# Patient Record
Sex: FEMALE | Race: WHITE | URBAN - METROPOLITAN AREA
[De-identification: names, ages, dates, MRNs, and addresses within clinical notes are randomized per-mention and may not be internally consistent; named-entity substitution may affect disease eponyms.]

---

## 2018-05-18 ENCOUNTER — OFFICE VISIT (OUTPATIENT)
Dept: FAMILY MEDICINE | Facility: CLINIC | Age: 53
End: 2018-05-18
Payer: COMMERCIAL

## 2018-05-18 VITALS
DIASTOLIC BLOOD PRESSURE: 78 MMHG | TEMPERATURE: 98.4 F | OXYGEN SATURATION: 99 % | HEART RATE: 62 BPM | RESPIRATION RATE: 16 BRPM | SYSTOLIC BLOOD PRESSURE: 116 MMHG

## 2018-05-18 DIAGNOSIS — Z23 NEED FOR VACCINATION: ICD-10-CM

## 2018-05-18 DIAGNOSIS — H11.31 SUBCONJUNCTIVAL HEMORRHAGE OF RIGHT EYE: Primary | ICD-10-CM

## 2018-05-18 PROCEDURE — 99203 OFFICE O/P NEW LOW 30 MIN: CPT | Mod: 25 | Performed by: NURSE PRACTITIONER

## 2018-05-18 PROCEDURE — 90715 TDAP VACCINE 7 YRS/> IM: CPT | Performed by: NURSE PRACTITIONER

## 2018-05-18 PROCEDURE — 90471 IMMUNIZATION ADMIN: CPT | Performed by: NURSE PRACTITIONER

## 2018-05-18 NOTE — MR AVS SNAPSHOT
"              After Visit Summary   2018    Ivanna Mahan    MRN: [id number]           Patient Information     Date Of Birth          1965        Visit Information        Provider Department      2018 2:40 PM Faby Isaacs APRN CNP [facility]        Today's Diagnoses     Subconjunctival hemorrhage of right eye    -  1    Need for vaccination           Follow-ups after your visit        Who to contact     If you have questions or need follow up information about today's clinic visit or your schedule please contact [facility] directly at [phone].  Normal or non-critical lab and imaging results will be communicated to you by M-Farmhart, letter or phone within 4 business days after the clinic has received the results. If you do not hear from us within 7 days, please contact the clinic through M-Farmhart or phone. If you have a critical or abnormal lab result, we will notify you by phone as soon as possible.  Submit refill requests through LabDoor or call your pharmacy and they will forward the refill request to us. Please allow 3 business days for your refill to be completed.          Additional Information About Your Visit        MyChart Information     LabDoor lets you send messages to your doctor, view your test results, renew your prescriptions, schedule appointments and more. To sign up, go to [email or web address] . Click on \"Log in\" on the left side of the screen, which will take you to the Welcome page. Then click on \"Sign up Now\" on the right side of the page.     You will be asked to enter the access code listed below, as well as some personal information. Please follow the directions to create your username and password.     Your access code is: [email or web address]  Expires: 2018  4:00 PM     Your access code will  in 90 days. If you need help or a new code, please call your [facility] or [phone].        Care EveryWhere ID     This is your Care " EveryWhere ID. This could be used by other organizations to access your [facility] medical records  [id number]        Your Vitals Were     Pulse Temperature Respirations Pulse Oximetry          62 98.4  F (36.9  C) (Temporal) 16 99%         Blood Pressure from Last 3 Encounters:   05/18/18 116/78   03/24/09 104/62   06/03/08 110/80    Weight from Last 3 Encounters:   03/16/07 160 lb (72.6 kg)              We Performed the Following     1st  Administration  [13607]     TDAP VACCINE (ADACEL) [05956.002]        Primary Care Provider Office Phone # Fax #    Faby DENIS Reynoso [facility] [phone] [address] [facility] [address]        Equal Access to Services     ROBERTO CARLOS DESHPANDE : Hadii aad ku hadasho Soomaali, waaxda luqadaha, qaybta kaalmada adeegyada, waxay kristiin hayjanusz mcgill . So [facility] [phone].    ATENCIÓN: Si habla español, tiene a molina disposición servicios gratuitos de asistencia lingüística. Llame al [phone].    We comply with applicable federal civil rights laws and [place] laws. We do not discriminate on the basis of race, color, national origin, age, disability, sex, sexual orientation, or gender identity.            Thank you!     Thank you for choosing [facility]  for your care. Our goal is always to provide you with excellent care. Hearing back from our patients is one way we can continue to improve our services. Please take a few minutes to complete the written survey that you may receive in the mail after your visit with us. Thank you!             Your Updated Medication List - Protect others around you: Learn how to safely use, store and throw away your medicines at www.disposemymeds.org.          This list is accurate as of 5/18/18  4:00 PM.  Always use your most recent med list.                   Brand Name Dispense Instructions for use Diagnosis    bisacodyl 5 MG EC tablet    DULCOLAX    4 tablet    Take 1 tablet by mouth daily as needed for  constipation. Take as directed for colonoscopy prep    Personal history of colonic polyps       levothyroxine 100 MCG tablet    SYNTHROID    30 tablet    Take 1 tablet by mouth daily.    Other specified acquired hypothyroidism       polyethylene glycol powder    MIRALAX    510 g    Take  by mouth daily. Mix and take as directed for colonoscopy prep    Personal history of colonic polyps

## 2018-05-18 NOTE — NURSING NOTE
"Chief Complaint   Patient presents with     Red Eye       Initial /78  Pulse 62  Temp 98.4  F (36.9  C) (Temporal)  Resp 16  SpO2 99% Estimated body mass index is 27.46 kg/(m^2) as calculated from the following:    Height as of 11/28/06: 5' 4\" (1.626 m).    Weight as of 3/16/07: 160 lb (72.6 kg).  Medication Reconciliation: complete    Darby Hammer CMA      "

## 2018-05-18 NOTE — PROGRESS NOTES
"  SUBJECTIVE:   Ivanna Mahan is a 52 year old female who presents to clinic today for the following health issues:  PCP in [facility], here visiting her daughter.     HPI   Eye(s) Problem  Onset: 1 day    Description:   Location: right  Pain: no  Redness: YES    Accompanying Signs & Symptoms:  Discharge/mattering: no  Swelling: no  Visual changes: YES- \"looks a little fuzzier on that side\"  Fever: no  Nasal Congestion: no  Bothered by bright lights: no    History:   Trauma: no   Foreign body exposure: no    Precipitating factors:   Wearing contacts: no    Alleviating factors:  Improved by: none    Therapies Tried and outcome: NA    Problem list and histories reviewed & adjusted, as indicated.  Additional history: as documented    Patient repots that she noticed it was slightly red last night and she woke up this morning and it was red. She states that she thinks she has a scratch on her eye- touched a dark spot and there was a ridge on there eye surface. She states that she has not itched it or had anything get stuck in it. She denies wearing contacts, having pink eye exposure, or URI symptoms. She is not on any NSAIDs.    Patient Active Problem List   Diagnosis     Acquired hypothyroidism     Past Surgical History:   Procedure Laterality Date     HC COLONOSCOPY W SNARE REMOVAL TUMOR/POLYP/LESION  05    REPEAT IN 5 YEARS     NO HISTORY OF SURGERY         Social History   Substance Use Topics     Smoking status: Never Smoker     Smokeless tobacco: Never Used     Alcohol use Yes      Comment: social     Family History   Problem Relation Age of Onset     CANCER Mother      colon,  x 2     Cancer - colorectal Mother      DIABETES Other      aunt,      Thyroid Disease Sister      Neurologic Disorder Paternal Uncle      parkinson     Cancer - colorectal Maternal Aunt      Cancer - colorectal Maternal Uncle          Current Outpatient Prescriptions   Medication Sig Dispense Refill     bisacodyl " (DULCOLAX) 5 MG EC tablet Take 1 tablet by mouth daily as needed for constipation. Take as directed for colonoscopy prep (Patient not taking: Reported on 5/18/2018) 4 tablet 0     levothyroxine (SYNTHROID) 100 MCG tablet Take 1 tablet by mouth daily. (Patient not taking: Reported on 5/18/2018) 30 tablet 1     polyethylene glycol (MIRALAX) powder Take  by mouth daily. Mix and take as directed for colonoscopy prep (Patient not taking: Reported on 5/18/2018) 510 g 0     Allergies   Allergen Reactions     No Known Allergies      No lab results found.   BP Readings from Last 3 Encounters:   05/18/18 116/78   03/24/09 104/62   06/03/08 110/80    Wt Readings from Last 3 Encounters:   03/16/07 160 lb (72.6 kg)        ROS:  Constitutional, HEENT, cardiovascular, pulmonary, GI, , musculoskeletal, neuro, skin, endocrine and psych systems are negative, except as otherwise noted.    This document serves as a record of the services and decisions personally performed and made by Faby Isaacs CNP. It was created on his/her behalf by Chrissie Liu, trained medical scribe. The creation of this document is based the provider's statements to the medical scribes.    Dwayneibchandler Liu 3:00 PM, May 18, 2018  OBJECTIVE:     /78  Pulse 62  Temp 98.4  F (36.9  C) (Temporal)  Resp 16  SpO2 99%  There is no height or weight on file to calculate BMI.     GENERAL: healthy, alert and no distress  EYES: subconjunctival hemorrage of lower, inner corner of left eye, slight hematoma formation of lower portion of scleral however this is mild, no scleral injection, no otherwise sclerae are normal, EOM intact.  HENT: ear canals and TM's normal, nose and mouth without ulcers or lesions  NECK: no adenopathy, no asymmetry, masses, or scars and thyroid normal to palpation    Diagnostic Test Results:  none     ASSESSMENT/PLAN:       ICD-10-CM    1. Subconjunctival hemorrhage of right eye H11.31    2. Need for vaccination Z23 1st   Administration  [52038]     TDAP VACCINE (ADACEL) [94640.002]     Reviewed symptoms and etiology patient presents today. Advised starting OTC saline eye drops for comfort. Reviewed the healing course of a subconjunctival hemorrhage, healing and resolution and advised to not to touch her eye.    Tdap immunization administered today.    Follow up with PCP if recurrent and will provide ophthalmology specialist referral.      The information in this document, created by the medical scribe for me, accurately reflects the services I personally performed and the decisions made by me. I have reviewed and approved this document for accuracy prior to leaving the patient care area.  Faby Iasacs CNP  3:00 PM, 05/18/18    DENIS Weems CNP  [facility]

## 2018-05-20 ENCOUNTER — HEALTH MAINTENANCE LETTER (OUTPATIENT)
Age: 53
End: 2018-05-20

## 2018-07-16 ENCOUNTER — TELEPHONE (OUTPATIENT)
Dept: FAMILY MEDICINE | Facility: CLINIC | Age: 53
End: 2018-07-16

## 2018-07-16 NOTE — LETTER
[facility]  [address]  [place] [address]  Phone: [phone]  Fax: [phone]  July 31, 2018      Ivanna Mahan  [address] [place] [id number]      Dear Ivanna,    We care about your health and have reviewed your health plan including your medical conditions, medications, and lab results.  Based on this review, it is recommended that you follow up regarding the following health topic(s):  -Breast Cancer Screening  -Colon Cancer Screening  -Cervical Cancer Screening  -Wellness (Physical) Visit     We recommend you take the following action(s):  -schedule a WELLNESS (Physical) APPOINTMENT.  We will perform the following labs: Lipids (fasting cholesterol - nothing to eat except water and/or meds for 8-10 hours) and TSH (thyroid test).  -schedule a MAMMOGRAM which is due. Please disregard this reminder if you have had this exam elsewhere within the last 1-2 years please let us know so we can update your records.  -schedule a COLONOSCOPY to look for colon cancer (due every 10 years or 5 years in higher risk situations.)  Colonoscopies can prevent 90-95% of colon cancer deaths.  Problem lesions can be removed before they ever become cancer.  If you do not wish to do a colonoscopy or cannot afford to do one at this time, there is another option called a Fecal Immunochemical Occult Blood Test (FIT) a take home stool sample kit.  It does not replace the colonoscopy for colorectal cancer screening, but it can detect hidden bleeding in the lower colon.  It does need to be repeated every year and if a positive result is obtained, you would be referred for a colonoscopy.  If you have completed either one of these tests at another facility, please have the records sent to our clinic for our records.  -schedule a PAP SMEAR EXAM which is due.  Please disregard this reminder if you have had this exam elsewhere within the last year.  It would be helpful for us to have a  copy of your recent pap smear report to update your records.     Please call us at the [facility] - [phone] (or use En[facility]) to address the above recommendations.     Thank you for trusting [facility] and we appreciate the opportunity to serve you.  We look forward to supporting your healthcare needs in the future.    Healthy Regards,    Your Health Care Team  [facility]

## 2018-07-16 NOTE — TELEPHONE ENCOUNTER
Summary:    Patient is due/failing the following:   TSH, COLONOSCOPY, LDL, MAMMOGRAM, PAP and PHYSICAL    Action needed:   Patient needs office visit for PAP/PE., Patient needs fasting lab only appointment and schedule a mammogram and colonoscopy     Type of outreach:    Phone, spoke to patient.  patient requested a call back on 07/17/2018 to assist with scheduling    Questions for provider review:    None                                                                                                                                    Cayla Lafleur       Chart routed to Care Team .        Panel Management Review      Patient has the following on her problem list: None      Composite cancer screening  Chart review shows that this patient is due/due soon for the following Pap Smear, Mammogram and Colonoscopy

## 2018-10-03 ENCOUNTER — TELEPHONE (OUTPATIENT)
Dept: FAMILY MEDICINE | Facility: CLINIC | Age: 53
End: 2018-10-03

## 2018-10-03 NOTE — TELEPHONE ENCOUNTER
Summary:    Patient is due/failing the following:   COLONOSCOPY, TSH,  LDL, MAMMOGRAM, PAP and PHYSICAL    Action needed:   Patient needs office visit for physical and PAP ., Patient needs fasting lab only appointment and schedule a mammogram and colonoscopy or complete a FIT test     Type of outreach:    Phone, left message for patient to call back.     Questions for provider review:    None                                                                                                                                    Cayla Lafleur       Chart routed to Care Team .        Panel Management Review      Patient has the following on her problem list: None      Composite cancer screening  Chart review shows that this patient is due/due soon for the following Pap Smear, Mammogram and Colonoscopy

## 2018-10-22 ENCOUNTER — TRANSFERRED RECORDS (OUTPATIENT)
Dept: HEALTH INFORMATION MANAGEMENT | Facility: CLINIC | Age: 53
End: 2018-10-22

## 2018-10-22 LAB
HPV ABSTRACT: NORMAL
PAP-ABSTRACT: NORMAL

## 2018-11-01 NOTE — TELEPHONE ENCOUNTER
Please abstract the following data from this visit with this patient into the appropriate field in Epic:    Mammogram done on this date: 10/22/2018 (approximately), by this group: [facility], .   Pap smear done on this date: 10/22/2018 (approximately), by this group: [facility] .     Cayla Lafleur

## 2018-11-13 ENCOUNTER — TRANSFERRED RECORDS (OUTPATIENT)
Dept: HEALTH INFORMATION MANAGEMENT | Facility: CLINIC | Age: 53
End: 2018-11-13

## 2019-01-08 ENCOUNTER — TELEPHONE (OUTPATIENT)
Dept: FAMILY MEDICINE | Facility: CLINIC | Age: 54
End: 2019-01-08

## 2019-01-08 NOTE — TELEPHONE ENCOUNTER
Please abstract the following data from this visit with this patient into the appropriate field in Epic:    Colonoscopy done on this date: 11/13/2018 (approximately), by this group: [facility], results in care everywhere.    Cayla Lafleur       Summary:    Patient is due/failing the following:   COLONOSCOPY    Action needed:   None per care everywhere UTD    Type of outreach:    none    Questions for provider review:    None                                                                                                                                    Cayla Lafleur       Chart routed to Care Team .      Panel Management Review      Patient has the following on her problem list: None      Composite cancer screening  Chart review shows that this patient is due/due soon for the following Colonoscopy

## 2019-09-06 ENCOUNTER — TELEPHONE (OUTPATIENT)
Dept: FAMILY MEDICINE | Facility: CLINIC | Age: 54
End: 2019-09-06

## 2019-09-06 NOTE — TELEPHONE ENCOUNTER
Panel Management Review      Patient has the following on her problem list: NoneNone      Composite cancer screening  Chart review shows that this patient is due/due soon for the following None  Summary:    Patient is due/failing the following:   TSH, LDL and PHYSICAL    Action needed:   Patient needs office visit for Physical. and Patient needs fasting lab only appointment    Type of outreach:    Phone, spoke to patient.  patient is no longer [facility]    Questions for provider review:    None                                                                                                                                    Cayla Lafleur CMA       Chart routed to Care Team .

## 2022-02-02 ENCOUNTER — APPOINTMENT (OUTPATIENT)
Dept: URBAN - METROPOLITAN AREA CLINIC 260 | Age: 57
Setting detail: DERMATOLOGY
End: 2022-02-03

## 2022-02-02 VITALS — WEIGHT: 180 LBS | HEIGHT: 64 IN

## 2022-02-02 DIAGNOSIS — L82.1 OTHER SEBORRHEIC KERATOSIS: ICD-10-CM

## 2022-02-02 DIAGNOSIS — D22 MELANOCYTIC NEVI: ICD-10-CM

## 2022-02-02 DIAGNOSIS — D18.0 HEMANGIOMA: ICD-10-CM

## 2022-02-02 DIAGNOSIS — D49.2 NEOPLASM OF UNSPECIFIED BEHAVIOR OF BONE, SOFT TISSUE, AND SKIN: ICD-10-CM

## 2022-02-02 PROBLEM — D22.5 MELANOCYTIC NEVI OF TRUNK: Status: ACTIVE | Noted: 2022-02-02

## 2022-02-02 PROBLEM — D18.01 HEMANGIOMA OF SKIN AND SUBCUTANEOUS TISSUE: Status: ACTIVE | Noted: 2022-02-02

## 2022-02-02 PROCEDURE — OTHER COUNSELING: OTHER

## 2022-02-02 PROCEDURE — OTHER ELECTRODESICCATION (COSMETIC): OTHER

## 2022-02-02 PROCEDURE — OTHER ADDITIONAL NOTES: OTHER

## 2022-02-02 PROCEDURE — OTHER BENIGN DESTRUCTION COSMETIC MULTI: OTHER

## 2022-02-02 PROCEDURE — OTHER MIPS QUALITY: OTHER

## 2022-02-02 PROCEDURE — OTHER BIOPSY BY SHAVE METHOD: OTHER

## 2022-02-02 PROCEDURE — 99203 OFFICE O/P NEW LOW 30 MIN: CPT | Mod: 25

## 2022-02-02 PROCEDURE — OTHER LIQUID NITROGEN (COSMETIC): OTHER

## 2022-02-02 PROCEDURE — 11102 TANGNTL BX SKIN SINGLE LES: CPT

## 2022-02-02 PROCEDURE — OTHER EDUCATIONAL RESOURCES PROVIDED: OTHER

## 2022-02-02 ASSESSMENT — LOCATION SIMPLE DESCRIPTION DERM
LOCATION SIMPLE: LEFT FOREHEAD
LOCATION SIMPLE: UPPER BACK
LOCATION SIMPLE: HAIR
LOCATION SIMPLE: RIGHT FOREHEAD
LOCATION SIMPLE: LEFT ANTERIOR NECK
LOCATION SIMPLE: RIGHT ANTERIOR NECK
LOCATION SIMPLE: ABDOMEN
LOCATION SIMPLE: LEFT FOREHEAD
LOCATION SIMPLE: ABDOMEN
LOCATION SIMPLE: RIGHT SHOULDER
LOCATION SIMPLE: CHEST

## 2022-02-02 ASSESSMENT — LOCATION DETAILED DESCRIPTION DERM
LOCATION DETAILED: LEFT CLAVICULAR NECK
LOCATION DETAILED: LEFT FOREHEAD
LOCATION DETAILED: LEFT LATERAL SUPERIOR CHEST
LOCATION DETAILED: LEFT SUPERIOR FOREHEAD
LOCATION DETAILED: PERIUMBILICAL SKIN
LOCATION DETAILED: RIGHT SUPERIOR LATERAL FOREHEAD
LOCATION DETAILED: RIGHT INFERIOR LATERAL NECK
LOCATION DETAILED: SUPERIOR THORACIC SPINE
LOCATION DETAILED: EPIGASTRIC SKIN
LOCATION DETAILED: LEFT LATERAL ABDOMEN
LOCATION DETAILED: RIGHT ANTERIOR SHOULDER
LOCATION DETAILED: LEFT RIB CAGE
LOCATION DETAILED: RIGHT CLAVICULAR NECK
LOCATION DETAILED: HAIR

## 2022-02-02 ASSESSMENT — LOCATION ZONE DERM
LOCATION ZONE: ARM
LOCATION ZONE: NECK
LOCATION ZONE: SCALP
LOCATION ZONE: FACE
LOCATION ZONE: TRUNK
LOCATION ZONE: FACE
LOCATION ZONE: TRUNK

## 2022-02-02 NOTE — PROCEDURE: MIPS QUALITY
Quality 226: Preventive Care And Screening: Tobacco Use: Screening And Cessation Intervention: Patient screened for tobacco use and is an ex/non-smoker
Quality 110: Preventive Care And Screening: Influenza Immunization: Influenza Immunization not Administered for Documented Reasons.
Quality 431: Preventive Care And Screening: Unhealthy Alcohol Use - Screening: Patient not identified as an unhealthy alcohol user when screened for unhealthy alcohol use using a systematic screening method
Detail Level: Detailed
Quality 130: Documentation Of Current Medications In The Medical Record: Current Medications Documented

## 2022-02-02 NOTE — PROCEDURE: ELECTRODESICCATION (COSMETIC)
Anesthesia Type: 1% lidocaine with epinephrine
Consent: The patient's consent was obtained including but not limited to risks of crusting, scabbing, blistering, scarring, darker or lighter pigmentary change, recurrence, incomplete removal and infection.
Price (Use Numbers Only, No Special Characters Or $): 50
Mckenzie: 2
Post-Care Instructions: I reviewed with the patient in detail post-care instructions. Patient is to wear sunprotection, and avoid picking at any of the treated lesions. Pt may apply Vaseline to crusted or scabbing areas
Detail Level: Detailed

## 2022-02-02 NOTE — PROCEDURE: COUNSELING
Detail Level: Zone
Sunscreen Recommendations: Patient asked about removal. We can remove this mole but there?s always a chance of them coming back and there?s always a chance for a scar.
Detail Level: Detailed

## 2022-02-02 NOTE — PROCEDURE: BIOPSY BY SHAVE METHOD
Biopsy Type: H and E
Hide Additional Size Dimension?: No
Electrodesiccation And Curettage Text: The wound bed was treated with electrodesiccation and curettage after the biopsy was performed.
Post-Care Instructions: I reviewed with the patient in detail post-care instructions. Patient is to keep the biopsy site dry overnight, and then apply bacitracin twice daily until healed. Patient may apply hydrogen peroxide soaks to remove any crusting.
Dressing: bandage
Biopsy Method: Dermablade
Notification Instructions: Patient will be notified of biopsy results. However, patient instructed to call the office if not contacted within 2 weeks.
Hemostasis: Drysol
Detail Level: Detailed
Anesthesia Type: 1% lidocaine with epinephrine
Size Of Lesion In Cm: 0
Cryotherapy Text: The wound bed was treated with cryotherapy after the biopsy was performed.
Consent: Written consent was obtained and risks were reviewed including but not limited to scarring, infection, bleeding, scabbing, incomplete removal, nerve damage and allergy to anesthesia.
Billing Type: Third-Party Bill
Depth Of Biopsy: dermis
Wound Care: Petrolatum
Electrodesiccation Text: The wound bed was treated with electrodesiccation after the biopsy was performed.
Anesthesia Volume In Cc (Will Not Render If 0): 0.2
Silver Nitrate Text: The wound bed was treated with silver nitrate after the biopsy was performed.
Validate Note Data (See Information Below): Yes
Type Of Destruction Used: Curettage
Information: Selecting Yes will display possible errors in your note based on the variables you have selected. This validation is only offered as a suggestion for you. PLEASE NOTE THAT THE VALIDATION TEXT WILL BE REMOVED WHEN YOU FINALIZE YOUR NOTE. IF YOU WANT TO FAX A PRELIMINARY NOTE YOU WILL NEED TO TOGGLE THIS TO 'NO' IF YOU DO NOT WANT IT IN YOUR FAXED NOTE.
Curettage Text: The wound bed was treated with curettage after the biopsy was performed.

## 2022-02-02 NOTE — PROCEDURE: BENIGN DESTRUCTION COSMETIC MULTI
Post-Care Instructions: I reviewed with the patient in detail post-care instructions. Patient is to wear sunprotection, and avoid picking at any of the treated lesions. Pt may apply Vaseline to crusted or scabbing areas.
Detail Level: Zone
Anesthesia Type: 2% lidocaine with epinephrine
Total Number Of Lesions Treated: 2
Price (Use Numbers Only, No Special Characters Or $): 50
Consent: The patient's consent was obtained including but not limited to risks of crusting, scabbing, blistering, scarring, darker or lighter pigmentary change, recurrence, incomplete removal and infection.
Anesthesia Volume In Cc: 1

## 2022-02-02 NOTE — PROCEDURE: ADDITIONAL NOTES
Render Risk Assessment In Note?: no
Detail Level: Detailed
Additional Notes: She has not had flu shot

## 2022-02-02 NOTE — HPI: EVALUATION OF SKIN LESION(S)
How Severe Are Your Spot(S)?: mild
Hpi Title: Evaluation of Skin Lesions
Additional History: She has not been seen since her melanoma.

## 2022-02-02 NOTE — PROCEDURE: LIQUID NITROGEN (COSMETIC)
Render Post-Care Instructions In Note?: no
Show Spray Paint Technique Variable?: Yes
Post-Care Instructions: I reviewed with the patient in detail post-care instructions. Patient is to wear sunprotection, and avoid picking at any of the treated lesions. Pt may apply Vaseline to crusted or scabbing areas.
Consent: The patient's consent was obtained including but not limited to risks of crusting, scabbing, blistering, scarring, darker or lighter pigmentary change, recurrence, incomplete removal and infection. The patient understands that the procedure is cosmetic in nature and is not covered by insurance.
Detail Level: Detailed
Price (Use Numbers Only, No Special Characters Or $): 50
Spray Paint Text: The liquid nitrogen was applied to the skin utilizing a spray paint frosting technique.
Billing Information: Bill by Static Price

## 2024-04-24 ENCOUNTER — APPOINTMENT (OUTPATIENT)
Dept: URBAN - METROPOLITAN AREA CLINIC 260 | Age: 59
Setting detail: DERMATOLOGY
End: 2024-04-24

## 2024-04-24 VITALS — RESPIRATION RATE: 14 BRPM | WEIGHT: 190 LBS | HEIGHT: 64 IN

## 2024-04-24 DIAGNOSIS — L82.1 OTHER SEBORRHEIC KERATOSIS: ICD-10-CM

## 2024-04-24 DIAGNOSIS — L82.0 INFLAMED SEBORRHEIC KERATOSIS: ICD-10-CM

## 2024-04-24 DIAGNOSIS — D18.0 HEMANGIOMA: ICD-10-CM

## 2024-04-24 PROBLEM — D18.01 HEMANGIOMA OF SKIN AND SUBCUTANEOUS TISSUE: Status: ACTIVE | Noted: 2024-04-24

## 2024-04-24 PROCEDURE — OTHER COUNSELING: OTHER

## 2024-04-24 PROCEDURE — OTHER MIPS QUALITY: OTHER

## 2024-04-24 PROCEDURE — OTHER LIQUID NITROGEN: OTHER

## 2024-04-24 PROCEDURE — 17110 DESTRUCT B9 LESION 1-14: CPT

## 2024-04-24 PROCEDURE — 99212 OFFICE O/P EST SF 10 MIN: CPT | Mod: 25

## 2024-04-24 ASSESSMENT — LOCATION DETAILED DESCRIPTION DERM
LOCATION DETAILED: RIGHT RIB CAGE
LOCATION DETAILED: RIGHT MEDIAL FRONTAL SCALP
LOCATION DETAILED: POSTERIOR MID-PARIETAL SCALP
LOCATION DETAILED: PERIUMBILICAL SKIN

## 2024-04-24 ASSESSMENT — LOCATION SIMPLE DESCRIPTION DERM
LOCATION SIMPLE: ABDOMEN
LOCATION SIMPLE: POSTERIOR SCALP
LOCATION SIMPLE: RIGHT SCALP

## 2024-04-24 ASSESSMENT — LOCATION ZONE DERM
LOCATION ZONE: SCALP
LOCATION ZONE: TRUNK

## 2024-04-24 NOTE — PROCEDURE: LIQUID NITROGEN
Show Aperture Variable?: Yes
Medical Necessity Information: It is in your best interest to select a reason for this procedure from the list below. All of these items fulfill various CMS LCD requirements except the new and changing color options.
Number Of Freeze-Thaw Cycles: 2 freeze-thaw cycles
Render Note In Bullet Format When Appropriate: No
Medical Necessity Clause: This procedure was medically necessary because the lesions that were treated were:
Post-Care Instructions: I reviewed with the patient in detail post-care instructions. Patient is to wear sunprotection, and avoid picking at any of the treated lesions. Pt may apply Vaseline to crusted or scabbing areas.
Duration Of Freeze Thaw-Cycle (Seconds): 10-15
Detail Level: Detailed
Spray Paint Text: The liquid nitrogen was applied to the skin utilizing a spray paint frosting technique.
Consent: The patient's consent was obtained including but not limited to risks of crusting, scabbing, blistering, scarring, darker or lighter pigmentary change, recurrence, incomplete removal and infection.

## 2024-04-24 NOTE — HPI: SKIN LESION
What Type Of Note Output Would You Prefer (Optional)?: Standard Output
Is This A New Presentation, Or A Follow-Up?: Skin Lesions
Additional History: These lesions have been present for years. The lesion on her right rib cage and lower abdomen will rub on clothing and she is interested in removal.